# Patient Record
Sex: FEMALE | Race: WHITE | Employment: UNEMPLOYED | URBAN - METROPOLITAN AREA
[De-identification: names, ages, dates, MRNs, and addresses within clinical notes are randomized per-mention and may not be internally consistent; named-entity substitution may affect disease eponyms.]

---

## 2022-05-15 ENCOUNTER — HOSPITAL ENCOUNTER (OUTPATIENT)
Age: 27
Setting detail: OBSERVATION
Discharge: HOME | End: 2022-05-16
Attending: OBSTETRICS & GYNECOLOGY | Admitting: OBSTETRICS & GYNECOLOGY
Payer: COMMERCIAL

## 2022-05-15 PROBLEM — O16.2 HYPERTENSION AFFECTING PREGNANCY IN SECOND TRIMESTER: Status: ACTIVE | Noted: 2022-05-15

## 2022-05-15 LAB
ALBUMIN SERPL-MCNC: 3 G/DL (ref 3.5–5.2)
ALP BLD-CCNC: 105 U/L (ref 35–104)
ALT SERPL-CCNC: 7 U/L (ref 0–32)
AMPHETAMINE SCREEN, URINE: NOT DETECTED
ANION GAP SERPL CALCULATED.3IONS-SCNC: 12 MMOL/L (ref 7–16)
AST SERPL-CCNC: 13 U/L (ref 0–31)
BACTERIA: ABNORMAL /HPF
BARBITURATE SCREEN URINE: NOT DETECTED
BASOPHILS ABSOLUTE: 0.02 E9/L (ref 0–0.2)
BASOPHILS RELATIVE PERCENT: 0.2 % (ref 0–2)
BENZODIAZEPINE SCREEN, URINE: NOT DETECTED
BILIRUB SERPL-MCNC: <0.2 MG/DL (ref 0–1.2)
BILIRUBIN URINE: NEGATIVE
BLOOD, URINE: NEGATIVE
BUN BLDV-MCNC: 11 MG/DL (ref 6–20)
CALCIUM SERPL-MCNC: 9.3 MG/DL (ref 8.6–10.2)
CANNABINOID SCREEN URINE: POSITIVE
CHLORIDE BLD-SCNC: 102 MMOL/L (ref 98–107)
CLARITY: ABNORMAL
CO2: 21 MMOL/L (ref 22–29)
COCAINE METABOLITE SCREEN URINE: NOT DETECTED
COLOR: YELLOW
CREAT SERPL-MCNC: 0.6 MG/DL (ref 0.5–1)
CRYSTALS, UA: ABNORMAL /HPF
EOSINOPHILS ABSOLUTE: 0.17 E9/L (ref 0.05–0.5)
EOSINOPHILS RELATIVE PERCENT: 1.5 % (ref 0–6)
EPITHELIAL CELLS, UA: ABNORMAL /HPF
FENTANYL SCREEN, URINE: NOT DETECTED
GFR AFRICAN AMERICAN: >60
GFR NON-AFRICAN AMERICAN: >60 ML/MIN/1.73
GLUCOSE BLD-MCNC: 96 MG/DL (ref 74–99)
GLUCOSE URINE: NEGATIVE MG/DL
HCT VFR BLD CALC: 37.2 % (ref 34–48)
HEMOGLOBIN: 12.7 G/DL (ref 11.5–15.5)
IMMATURE GRANULOCYTES #: 0.04 E9/L
IMMATURE GRANULOCYTES %: 0.4 % (ref 0–5)
KETONES, URINE: ABNORMAL MG/DL
LEUKOCYTE ESTERASE, URINE: ABNORMAL
LYMPHOCYTES ABSOLUTE: 3.74 E9/L (ref 1.5–4)
LYMPHOCYTES RELATIVE PERCENT: 32.8 % (ref 20–42)
Lab: ABNORMAL
MCH RBC QN AUTO: 30.5 PG (ref 26–35)
MCHC RBC AUTO-ENTMCNC: 34.1 % (ref 32–34.5)
MCV RBC AUTO: 89.2 FL (ref 80–99.9)
METHADONE SCREEN, URINE: NOT DETECTED
MONOCYTES ABSOLUTE: 0.9 E9/L (ref 0.1–0.95)
MONOCYTES RELATIVE PERCENT: 7.9 % (ref 2–12)
NEUTROPHILS ABSOLUTE: 6.53 E9/L (ref 1.8–7.3)
NEUTROPHILS RELATIVE PERCENT: 57.2 % (ref 43–80)
NITRITE, URINE: NEGATIVE
OPIATE SCREEN URINE: NOT DETECTED
OXYCODONE URINE: NOT DETECTED
PDW BLD-RTO: 12.7 FL (ref 11.5–15)
PH UA: 6 (ref 5–9)
PHENCYCLIDINE SCREEN URINE: NOT DETECTED
PLATELET # BLD: 291 E9/L (ref 130–450)
PMV BLD AUTO: 9.7 FL (ref 7–12)
POTASSIUM SERPL-SCNC: 3.8 MMOL/L (ref 3.5–5)
PROTEIN UA: 30 MG/DL
RBC # BLD: 4.17 E12/L (ref 3.5–5.5)
RBC UA: ABNORMAL /HPF (ref 0–2)
SODIUM BLD-SCNC: 135 MMOL/L (ref 132–146)
SPECIFIC GRAVITY UA: >=1.03 (ref 1–1.03)
TOTAL PROTEIN: 6.3 G/DL (ref 6.4–8.3)
URIC ACID, SERUM: 6 MG/DL (ref 2.4–5.7)
UROBILINOGEN, URINE: 1 E.U./DL
WBC # BLD: 11.4 E9/L (ref 4.5–11.5)
WBC UA: ABNORMAL /HPF (ref 0–5)

## 2022-05-15 PROCEDURE — 96360 HYDRATION IV INFUSION INIT: CPT

## 2022-05-15 PROCEDURE — 96376 TX/PRO/DX INJ SAME DRUG ADON: CPT

## 2022-05-15 PROCEDURE — 96374 THER/PROPH/DIAG INJ IV PUSH: CPT

## 2022-05-15 PROCEDURE — 96366 THER/PROPH/DIAG IV INF ADDON: CPT

## 2022-05-15 PROCEDURE — 96375 TX/PRO/DX INJ NEW DRUG ADDON: CPT

## 2022-05-15 PROCEDURE — 80307 DRUG TEST PRSMV CHEM ANLYZR: CPT

## 2022-05-15 PROCEDURE — G0378 HOSPITAL OBSERVATION PER HR: HCPCS

## 2022-05-15 PROCEDURE — 84550 ASSAY OF BLOOD/URIC ACID: CPT

## 2022-05-15 PROCEDURE — 6360000002 HC RX W HCPCS: Performed by: OBSTETRICS & GYNECOLOGY

## 2022-05-15 PROCEDURE — 80053 COMPREHEN METABOLIC PANEL: CPT

## 2022-05-15 PROCEDURE — 85025 COMPLETE CBC W/AUTO DIFF WBC: CPT

## 2022-05-15 PROCEDURE — 96372 THER/PROPH/DIAG INJ SC/IM: CPT

## 2022-05-15 PROCEDURE — 96361 HYDRATE IV INFUSION ADD-ON: CPT

## 2022-05-15 PROCEDURE — 96365 THER/PROPH/DIAG IV INF INIT: CPT

## 2022-05-15 PROCEDURE — 81001 URINALYSIS AUTO W/SCOPE: CPT

## 2022-05-15 PROCEDURE — 36415 COLL VENOUS BLD VENIPUNCTURE: CPT

## 2022-05-15 PROCEDURE — 6370000000 HC RX 637 (ALT 250 FOR IP): Performed by: OBSTETRICS & GYNECOLOGY

## 2022-05-15 PROCEDURE — G0480 DRUG TEST DEF 1-7 CLASSES: HCPCS

## 2022-05-15 PROCEDURE — 2580000003 HC RX 258: Performed by: OBSTETRICS & GYNECOLOGY

## 2022-05-15 RX ORDER — CALCIUM GLUCONATE 94 MG/ML
1000 INJECTION, SOLUTION INTRAVENOUS PRN
Status: DISCONTINUED | OUTPATIENT
Start: 2022-05-15 | End: 2022-05-16 | Stop reason: HOSPADM

## 2022-05-15 RX ORDER — SODIUM CHLORIDE, SODIUM LACTATE, POTASSIUM CHLORIDE, CALCIUM CHLORIDE 600; 310; 30; 20 MG/100ML; MG/100ML; MG/100ML; MG/100ML
INJECTION, SOLUTION INTRAVENOUS CONTINUOUS
Status: DISCONTINUED | OUTPATIENT
Start: 2022-05-15 | End: 2022-05-16 | Stop reason: HOSPADM

## 2022-05-15 RX ORDER — SERTRALINE HYDROCHLORIDE 100 MG/1
100 TABLET, FILM COATED ORAL DAILY
COMMUNITY

## 2022-05-15 RX ORDER — BETAMETHASONE SODIUM PHOSPHATE AND BETAMETHASONE ACETATE 3; 3 MG/ML; MG/ML
12 INJECTION, SUSPENSION INTRA-ARTICULAR; INTRALESIONAL; INTRAMUSCULAR; SOFT TISSUE ONCE
Status: COMPLETED | OUTPATIENT
Start: 2022-05-16 | End: 2022-05-16

## 2022-05-15 RX ORDER — MAGNESIUM SULFATE IN WATER 40 MG/ML
4000 INJECTION, SOLUTION INTRAVENOUS ONCE
Status: COMPLETED | OUTPATIENT
Start: 2022-05-15 | End: 2022-05-15

## 2022-05-15 RX ORDER — ACETAMINOPHEN 325 MG/1
650 TABLET ORAL EVERY 4 HOURS PRN
Status: DISCONTINUED | OUTPATIENT
Start: 2022-05-15 | End: 2022-05-16 | Stop reason: HOSPADM

## 2022-05-15 RX ORDER — BETAMETHASONE SODIUM PHOSPHATE AND BETAMETHASONE ACETATE 3; 3 MG/ML; MG/ML
12 INJECTION, SUSPENSION INTRA-ARTICULAR; INTRALESIONAL; INTRAMUSCULAR; SOFT TISSUE ONCE
Status: COMPLETED | OUTPATIENT
Start: 2022-05-15 | End: 2022-05-15

## 2022-05-15 RX ADMIN — ACETAMINOPHEN 650 MG: 325 TABLET ORAL at 13:18

## 2022-05-15 RX ADMIN — SODIUM CHLORIDE, POTASSIUM CHLORIDE, SODIUM LACTATE AND CALCIUM CHLORIDE: 600; 310; 30; 20 INJECTION, SOLUTION INTRAVENOUS at 01:08

## 2022-05-15 RX ADMIN — MAGNESIUM SULFATE IN WATER 2000 MG/HR: 40 INJECTION, SOLUTION INTRAVENOUS at 13:23

## 2022-05-15 RX ADMIN — SODIUM CHLORIDE, POTASSIUM CHLORIDE, SODIUM LACTATE AND CALCIUM CHLORIDE: 600; 310; 30; 20 INJECTION, SOLUTION INTRAVENOUS at 08:20

## 2022-05-15 RX ADMIN — MAGNESIUM SULFATE HEPTAHYDRATE 4000 MG: 40 INJECTION, SOLUTION INTRAVENOUS at 02:52

## 2022-05-15 RX ADMIN — SODIUM CHLORIDE, POTASSIUM CHLORIDE, SODIUM LACTATE AND CALCIUM CHLORIDE: 600; 310; 30; 20 INJECTION, SOLUTION INTRAVENOUS at 23:41

## 2022-05-15 RX ADMIN — MAGNESIUM SULFATE IN WATER 2000 MG/HR: 40 INJECTION, SOLUTION INTRAVENOUS at 03:17

## 2022-05-15 RX ADMIN — SODIUM CHLORIDE, POTASSIUM CHLORIDE, SODIUM LACTATE AND CALCIUM CHLORIDE: 600; 310; 30; 20 INJECTION, SOLUTION INTRAVENOUS at 16:00

## 2022-05-15 RX ADMIN — SERTRALINE 100 MG: 50 TABLET, FILM COATED ORAL at 15:46

## 2022-05-15 RX ADMIN — BETAMETHASONE SODIUM PHOSPHATE AND BETAMETHASONE ACETATE 12 MG: 3; 3 INJECTION, SUSPENSION INTRA-ARTICULAR; INTRALESIONAL; INTRAMUSCULAR at 02:53

## 2022-05-15 RX ADMIN — ACETAMINOPHEN 650 MG: 325 TABLET ORAL at 21:53

## 2022-05-15 ASSESSMENT — PAIN SCALES - GENERAL
PAINLEVEL_OUTOF10: 3
PAINLEVEL_OUTOF10: 2

## 2022-05-15 ASSESSMENT — PAIN - FUNCTIONAL ASSESSMENT: PAIN_FUNCTIONAL_ASSESSMENT: ACTIVITIES ARE NOT PREVENTED

## 2022-05-15 ASSESSMENT — PAIN DESCRIPTION - DESCRIPTORS
DESCRIPTORS: POUNDING
DESCRIPTORS: OTHER (COMMENT)

## 2022-05-15 ASSESSMENT — PAIN DESCRIPTION - ORIENTATION
ORIENTATION: OTHER (COMMENT)
ORIENTATION: MID

## 2022-05-15 ASSESSMENT — PAIN DESCRIPTION - LOCATION
LOCATION: HEAD
LOCATION: HEAD

## 2022-05-15 NOTE — PROGRESS NOTES
Pt here 30wks G1 with c/o feeling lethargic at home and her mom took her BP and it was elevated. No c/o HA, blurred vision or epigastric pain. Pt states that her sister has just recently lost her baby due to preeclampsia and they were due only a few weeks apart. Pt does states that she has a hx of depression and has really been upset. Currently taking Zoloft and has been hospitalized in the past for SI. Pt states that she has been feeling the baby move normal for self, no leakage of fluid or vaginal bleeding.

## 2022-05-15 NOTE — PROGRESS NOTES
Dr. Nancy Jasso at the bedside to see patient. New orders given. 24 hour urine start time is 1020 5/15/2022 end time is 1020 5/16/2022. Patient is to use call light when up to void to have assistance to the bathroom. Patient is also to remain NPO. Patient educated on 24 hour urine collection and acknowledged education.  Call light within reach

## 2022-05-15 NOTE — PROGRESS NOTES
Assumed care of pt at this time from previous RN. Plan of care discussed. Pt verbalized understanding. No concerns expressed at this time. Pt lying in bed resting. Call light within reach.

## 2022-05-15 NOTE — PROGRESS NOTES
Assumed care of pt at this time. Plan of care discussed. Pt verbalized understanding. No other concerns expressed at this time. Hourly  vital signs, deep tendon reflexes, and LOC assessed. Patient on EKG continuously as well as FHM continuously assessed and documented every half hour. Patient NPO until new order given. Call light within reach.

## 2022-05-16 VITALS
DIASTOLIC BLOOD PRESSURE: 72 MMHG | HEART RATE: 68 BPM | RESPIRATION RATE: 18 BRPM | BODY MASS INDEX: 32.43 KG/M2 | SYSTOLIC BLOOD PRESSURE: 126 MMHG | OXYGEN SATURATION: 98 % | WEIGHT: 214 LBS | HEIGHT: 68 IN | TEMPERATURE: 98.1 F

## 2022-05-16 LAB
24HR URINE VOLUME (ML): 2475 ML
CREATININE 24 HOUR URINE: 1238 MG/24H (ref 720–1510)
INTEGRITY CHECK, CREATININE, URINE: 299.5
INTEGRITY CHECK, OXIDANT, URINE: 44
INTEGRITY CHECK, PH, URINE: 5.5 (ref 4.5–9)
INTEGRITY CHECK, SPECIFIC GRAVITY, URINE: 1.03 (ref 1–1.03)
INTEGRITY CHECK, SPECIMEN INTEGRITY, URINE: ABNORMAL
Lab: 24 HOURS
PROTEIN 24 HOUR URINE: 0.2 G/24HR (ref 0–0.14)

## 2022-05-16 PROCEDURE — 84156 ASSAY OF PROTEIN URINE: CPT

## 2022-05-16 PROCEDURE — 99202 OFFICE O/P NEW SF 15 MIN: CPT

## 2022-05-16 PROCEDURE — G0378 HOSPITAL OBSERVATION PER HR: HCPCS

## 2022-05-16 PROCEDURE — 96372 THER/PROPH/DIAG INJ SC/IM: CPT

## 2022-05-16 PROCEDURE — 96366 THER/PROPH/DIAG IV INF ADDON: CPT

## 2022-05-16 PROCEDURE — 6370000000 HC RX 637 (ALT 250 FOR IP): Performed by: OBSTETRICS & GYNECOLOGY

## 2022-05-16 PROCEDURE — 2580000003 HC RX 258: Performed by: OBSTETRICS & GYNECOLOGY

## 2022-05-16 PROCEDURE — 6360000002 HC RX W HCPCS: Performed by: OBSTETRICS & GYNECOLOGY

## 2022-05-16 RX ADMIN — BETAMETHASONE SODIUM PHOSPHATE AND BETAMETHASONE ACETATE 12 MG: 3; 3 INJECTION, SUSPENSION INTRA-ARTICULAR; INTRALESIONAL; INTRAMUSCULAR at 03:05

## 2022-05-16 RX ADMIN — SERTRALINE 100 MG: 50 TABLET, FILM COATED ORAL at 10:09

## 2022-05-16 RX ADMIN — SODIUM CHLORIDE, POTASSIUM CHLORIDE, SODIUM LACTATE AND CALCIUM CHLORIDE: 600; 310; 30; 20 INJECTION, SOLUTION INTRAVENOUS at 07:37

## 2022-05-16 NOTE — PROGRESS NOTES
Dr. Alexa Weaver in department. Orders received to stop Mag, pt may eat when ready, continue fetal heart monitoring, LR infusing, and 24 hr urine continued.

## 2022-05-16 NOTE — PROGRESS NOTES
Pt finished 24 hour urine. Agreeable to taking her Zoloft. Refuses EFM. States her parents ar in the car outside and she again wants to leave. Will follow up with MD, encourage to make an earlier appointment. IV discontinued. Pt signed AMA papers.

## 2022-05-16 NOTE — PROGRESS NOTES
Dr. Cuate Morfin informed of pt voicing plans for discharge. Will complete 24 hour urine and he will see her after office. RN will inform pt of plan.

## 2022-05-16 NOTE — PROGRESS NOTES
Pt states Dr. Jenifer Steiner said she may go home. Informed pt we need to finish 24 hour urine. Pt agreeable.

## 2022-05-17 ENCOUNTER — HOSPITAL ENCOUNTER (OUTPATIENT)
Age: 27
Setting detail: OBSERVATION
Discharge: HOME | End: 2022-05-18
Attending: OBSTETRICS & GYNECOLOGY | Admitting: OBSTETRICS & GYNECOLOGY
Payer: COMMERCIAL

## 2022-05-17 PROBLEM — O16.1 HYPERTENSION AFFECTING PREGNANCY IN FIRST TRIMESTER: Status: ACTIVE | Noted: 2022-05-17

## 2022-05-17 LAB
ALBUMIN SERPL-MCNC: 3.5 G/DL (ref 3.5–5.2)
ALP BLD-CCNC: 103 U/L (ref 35–104)
ALT SERPL-CCNC: 12 U/L (ref 0–32)
AMPHETAMINE SCREEN, URINE: NOT DETECTED
ANION GAP SERPL CALCULATED.3IONS-SCNC: 10 MMOL/L (ref 7–16)
AST SERPL-CCNC: 18 U/L (ref 0–31)
BACTERIA: ABNORMAL /HPF
BARBITURATE SCREEN URINE: NOT DETECTED
BASOPHILS ABSOLUTE: 0.02 E9/L (ref 0–0.2)
BASOPHILS RELATIVE PERCENT: 0.2 % (ref 0–2)
BENZODIAZEPINE SCREEN, URINE: NOT DETECTED
BILIRUB SERPL-MCNC: 0.3 MG/DL (ref 0–1.2)
BILIRUBIN URINE: NEGATIVE
BLOOD, URINE: NEGATIVE
BUN BLDV-MCNC: 10 MG/DL (ref 6–20)
CALCIUM SERPL-MCNC: 8.7 MG/DL (ref 8.6–10.2)
CANNABINOID SCREEN URINE: POSITIVE
CHLORIDE BLD-SCNC: 103 MMOL/L (ref 98–107)
CLARITY: CLEAR
CO2: 21 MMOL/L (ref 22–29)
COCAINE METABOLITE SCREEN URINE: NOT DETECTED
COLOR: YELLOW
COMMENT: NORMAL
CREAT SERPL-MCNC: 0.6 MG/DL (ref 0.5–1)
CRYSTALS, UA: ABNORMAL /HPF
EOSINOPHILS ABSOLUTE: 0.04 E9/L (ref 0.05–0.5)
EOSINOPHILS RELATIVE PERCENT: 0.3 % (ref 0–6)
FENTANYL SCREEN, URINE: NOT DETECTED
GFR AFRICAN AMERICAN: >60
GFR NON-AFRICAN AMERICAN: >60 ML/MIN/1.73
GLUCOSE BLD-MCNC: 66 MG/DL (ref 74–99)
GLUCOSE URINE: NEGATIVE MG/DL
HCT VFR BLD CALC: 38.7 % (ref 34–48)
HEMOGLOBIN: 12.4 G/DL (ref 11.5–15.5)
IMMATURE GRANULOCYTES #: 0.09 E9/L
IMMATURE GRANULOCYTES %: 0.7 % (ref 0–5)
KETONES, URINE: NEGATIVE MG/DL
LEUKOCYTE ESTERASE, URINE: NEGATIVE
LYMPHOCYTES ABSOLUTE: 3.76 E9/L (ref 1.5–4)
LYMPHOCYTES RELATIVE PERCENT: 30.9 % (ref 20–42)
Lab: ABNORMAL
MCH RBC QN AUTO: 30.3 PG (ref 26–35)
MCHC RBC AUTO-ENTMCNC: 32 % (ref 32–34.5)
MCV RBC AUTO: 94.6 FL (ref 80–99.9)
METHADONE SCREEN, URINE: NOT DETECTED
MONOCYTES ABSOLUTE: 0.9 E9/L (ref 0.1–0.95)
MONOCYTES RELATIVE PERCENT: 7.4 % (ref 2–12)
NEUTROPHILS ABSOLUTE: 7.36 E9/L (ref 1.8–7.3)
NEUTROPHILS RELATIVE PERCENT: 60.5 % (ref 43–80)
NITRITE, URINE: NEGATIVE
OPIATE SCREEN URINE: NOT DETECTED
OXYCODONE URINE: NOT DETECTED
PDW BLD-RTO: 13 FL (ref 11.5–15)
PH UA: 7.5 (ref 5–9)
PHENCYCLIDINE SCREEN URINE: NOT DETECTED
PLATELET # BLD: 291 E9/L (ref 130–450)
PMV BLD AUTO: 10.1 FL (ref 7–12)
POTASSIUM SERPL-SCNC: 3.9 MMOL/L (ref 3.5–5)
PROTEIN UA: 100 MG/DL
RBC # BLD: 4.09 E12/L (ref 3.5–5.5)
RBC UA: ABNORMAL /HPF (ref 0–2)
SODIUM BLD-SCNC: 134 MMOL/L (ref 132–146)
SPECIFIC GRAVITY UA: 1.02 (ref 1–1.03)
THC NORMALIZED, QUANTITIATIVE, URINE: NORMAL
THC-COOH, QUANTITATIVE, URINE: >1000
TOTAL PROTEIN: 6.5 G/DL (ref 6.4–8.3)
URIC ACID, SERUM: 5.8 MG/DL (ref 2.4–5.7)
UROBILINOGEN, URINE: 0.2 E.U./DL
WBC # BLD: 12.2 E9/L (ref 4.5–11.5)
WBC UA: ABNORMAL /HPF (ref 0–5)

## 2022-05-17 PROCEDURE — 96361 HYDRATE IV INFUSION ADD-ON: CPT

## 2022-05-17 PROCEDURE — 96376 TX/PRO/DX INJ SAME DRUG ADON: CPT

## 2022-05-17 PROCEDURE — G0480 DRUG TEST DEF 1-7 CLASSES: HCPCS

## 2022-05-17 PROCEDURE — 81001 URINALYSIS AUTO W/SCOPE: CPT

## 2022-05-17 PROCEDURE — 80307 DRUG TEST PRSMV CHEM ANLYZR: CPT

## 2022-05-17 PROCEDURE — 2580000003 HC RX 258: Performed by: OBSTETRICS & GYNECOLOGY

## 2022-05-17 PROCEDURE — 80053 COMPREHEN METABOLIC PANEL: CPT

## 2022-05-17 PROCEDURE — 84550 ASSAY OF BLOOD/URIC ACID: CPT

## 2022-05-17 PROCEDURE — 85025 COMPLETE CBC W/AUTO DIFF WBC: CPT

## 2022-05-17 PROCEDURE — 36415 COLL VENOUS BLD VENIPUNCTURE: CPT

## 2022-05-17 PROCEDURE — 6370000000 HC RX 637 (ALT 250 FOR IP): Performed by: OBSTETRICS & GYNECOLOGY

## 2022-05-17 PROCEDURE — 2500000003 HC RX 250 WO HCPCS: Performed by: OBSTETRICS & GYNECOLOGY

## 2022-05-17 RX ORDER — LABETALOL 20 MG/4 ML (5 MG/ML) INTRAVENOUS SYRINGE
10 ONCE
Status: COMPLETED | OUTPATIENT
Start: 2022-05-17 | End: 2022-05-17

## 2022-05-17 RX ORDER — LABETALOL 200 MG/1
100 TABLET, FILM COATED ORAL 2 TIMES DAILY
Status: DISCONTINUED | OUTPATIENT
Start: 2022-05-17 | End: 2022-05-18 | Stop reason: HOSPADM

## 2022-05-17 RX ORDER — LABETALOL 20 MG/4 ML (5 MG/ML) INTRAVENOUS SYRINGE
20 ONCE
Status: COMPLETED | OUTPATIENT
Start: 2022-05-17 | End: 2022-05-17

## 2022-05-17 RX ORDER — SODIUM CHLORIDE, SODIUM LACTATE, POTASSIUM CHLORIDE, CALCIUM CHLORIDE 600; 310; 30; 20 MG/100ML; MG/100ML; MG/100ML; MG/100ML
INJECTION, SOLUTION INTRAVENOUS CONTINUOUS
Status: DISCONTINUED | OUTPATIENT
Start: 2022-05-17 | End: 2022-05-18 | Stop reason: HOSPADM

## 2022-05-17 RX ADMIN — LABETALOL 20 MG/4 ML (5 MG/ML) INTRAVENOUS SYRINGE 10 MG: at 15:20

## 2022-05-17 RX ADMIN — LABETALOL HYDROCHLORIDE 100 MG: 200 TABLET, FILM COATED ORAL at 21:13

## 2022-05-17 RX ADMIN — SODIUM CHLORIDE, POTASSIUM CHLORIDE, SODIUM LACTATE AND CALCIUM CHLORIDE: 600; 310; 30; 20 INJECTION, SOLUTION INTRAVENOUS at 21:11

## 2022-05-17 RX ADMIN — LABETALOL 20 MG/4 ML (5 MG/ML) INTRAVENOUS SYRINGE 20 MG: at 17:28

## 2022-05-17 NOTE — PROGRESS NOTES
32w5d  EDC 22 via EMS to L&D for c/o hypertension at home. Patient reports that she was told to monitor her blood pressure readings at home because her sister recently suffered a miscarriage due to preeclampsia. Patient also recently seen on L&D for elevated blood pressures. Blood pressure reading at home was 145/101 per patient. EFM applied. Maternal perception of fetal movement noted. Denies any PIH symptoms, bleeding, or leaking of fluid. Does report occasional back pain but states this is not new. Patient presents with very flat affect, states she feels \"off\" but that her Zoloft dose was recently increased and she is very tired from crying a lot lately. Denies any thoughts of hurting self or others. Blood pressure elevated upon admission, cycling for trend. Call light in reach.

## 2022-05-17 NOTE — H&P
Subjective:      Danny Zavala is an 24 y.o. female at 28 and 5/7 weeks gestation presenting with   Chief Complaint   Patient presents with    Hypertension   . She is also complaining of contractions every a few minutes lasting few seconds. Other associated symptoms include low back pain. Fetal Movement: normal.  Patient was here yesterday for PIH work-up and has received 2 doses of Celestone and about 24 hours of magnesium sulfate. Patient discharged herself [address] yesterday afternoon. She claims that she protect her blood pressure at home today and was elevated to 142/80 and consequently decided to come in for further evaluation. Denies any headaches or blurry vision. On admission no her blood pressure was elevated to 170/96 and labetalol IV was given. PIH labs were repeated and no significant change. 24-hour urine collection was reviewed from yesterday and showed 200 mg of proteins. No past medical history on file. Review of Systems  Pertinent items are noted in HPI. Objective:      BP (!) 170/96   Pulse 60   Resp 16   LMP 09/30/2021   Blood pressure (!) 170/96, pulse 60, resp. rate 16, last menstrual period 09/30/2021. General:   alert, appears stated age and cooperative   Cervix:   closed.    FHT:   135 BPM     Lab Review    CBC:   Lab Results   Component Value Date    WBC 12.2 05/17/2022    RBC 4.09 05/17/2022    HGB 12.4 05/17/2022    HCT 38.7 05/17/2022    MCV 94.6 05/17/2022    MCH 30.3 05/17/2022    MCHC 32.0 05/17/2022    RDW 13.0 05/17/2022     05/17/2022    MPV 10.1 05/17/2022     CMP:    Lab Results   Component Value Date     05/17/2022    K 3.9 05/17/2022     05/17/2022    CO2 21 05/17/2022    BUN 10 05/17/2022    CREATININE 0.6 05/17/2022    GFRAA >60 05/17/2022    LABGLOM >60 05/17/2022    GLUCOSE 66 05/17/2022    PROT 6.5 05/17/2022    LABALBU 3.5 05/17/2022    CALCIUM 8.7 05/17/2022    BILITOT 0.3 05/17/2022    ALKPHOS 103 05/17/2022    AST 18 05/17/2022    ALT 12 05/17/2022     U/A:    Lab Results   Component Value Date    COLORU Yellow 05/17/2022    PHUR 7.5 05/17/2022    WBCUA NONE 05/17/2022    RBCUA NONE 05/17/2022    BACTERIA FEW 05/17/2022    CLARITYU Clear 05/17/2022    SPECGRAV 1.020 05/17/2022    LEUKOCYTESUR Negative 05/17/2022    UROBILINOGEN 0.2 05/17/2022    BILIRUBINUR Negative 05/17/2022    BLOODU Negative 05/17/2022    GLUCOSEU Negative 05/17/2022          Assessment:     Pregnancy-induced hypertension    Plan:      Continue close observation  Will start labetalol 100 mg p.o. twice daily and will administer IV labetalol as needed    Lizett Mcnamara MD,MD,5/17/2022 5:11 PM

## 2022-05-17 NOTE — PROGRESS NOTES
Assuming care of patient. Patient resting in bed comfortably with no complaints. Maternal perception of fetal movement noted. Plan of care discussed with patient; patient verbalizes understanding. Call light in reach.

## 2022-05-18 VITALS
RESPIRATION RATE: 16 BRPM | DIASTOLIC BLOOD PRESSURE: 95 MMHG | OXYGEN SATURATION: 100 % | TEMPERATURE: 98 F | HEART RATE: 50 BPM | SYSTOLIC BLOOD PRESSURE: 142 MMHG

## 2022-05-18 PROBLEM — O14.03 MILD PRE-ECLAMPSIA IN THIRD TRIMESTER: Status: ACTIVE | Noted: 2022-05-18

## 2022-05-18 LAB
COMMENT: NORMAL
INTEGRITY CHECK, CREATININE, URINE: 291.2
INTEGRITY CHECK, OXIDANT, URINE: 54
INTEGRITY CHECK, PH, URINE: 7.1 (ref 4.5–9)
INTEGRITY CHECK, SPECIFIC GRAVITY, URINE: 1.01 (ref 1–1.03)
INTEGRITY CHECK, SPECIMEN INTEGRITY, URINE: ABNORMAL
THC NORMALIZED, QUANTITIATIVE, URINE: NORMAL
THC-COOH, QUANTITATIVE, URINE: >1000

## 2022-05-18 PROCEDURE — G0378 HOSPITAL OBSERVATION PER HR: HCPCS

## 2022-05-18 PROCEDURE — 96361 HYDRATE IV INFUSION ADD-ON: CPT

## 2022-05-18 PROCEDURE — 96360 HYDRATION IV INFUSION INIT: CPT

## 2022-05-18 PROCEDURE — 2580000003 HC RX 258: Performed by: OBSTETRICS & GYNECOLOGY

## 2022-05-18 PROCEDURE — 96374 THER/PROPH/DIAG INJ IV PUSH: CPT

## 2022-05-18 PROCEDURE — 6370000000 HC RX 637 (ALT 250 FOR IP): Performed by: OBSTETRICS & GYNECOLOGY

## 2022-05-18 PROCEDURE — 99212 OFFICE O/P EST SF 10 MIN: CPT

## 2022-05-18 RX ORDER — LABETALOL 100 MG/1
100 TABLET, FILM COATED ORAL 2 TIMES DAILY
Qty: 60 TABLET | Refills: 3 | Status: SHIPPED | OUTPATIENT
Start: 2022-05-18

## 2022-05-18 RX ADMIN — SODIUM CHLORIDE, POTASSIUM CHLORIDE, SODIUM LACTATE AND CALCIUM CHLORIDE: 600; 310; 30; 20 INJECTION, SOLUTION INTRAVENOUS at 13:14

## 2022-05-18 RX ADMIN — SODIUM CHLORIDE, POTASSIUM CHLORIDE, SODIUM LACTATE AND CALCIUM CHLORIDE: 600; 310; 30; 20 INJECTION, SOLUTION INTRAVENOUS at 05:19

## 2022-05-18 RX ADMIN — LABETALOL HYDROCHLORIDE 100 MG: 200 TABLET, FILM COATED ORAL at 08:27

## 2022-05-18 NOTE — PROGRESS NOTES
Department of Obstetrics and Gynecology  Labor and Delivery   Attending Progress Note      SUBJECTIVE: Doing well with labetalol. Denies any headaches, blurry vision, right upper quadrant or epigastric pain. Patient insists on being discharged. OBJECTIVE:  Blood pressure (!) 153/85, pulse 58, temperature 98 °F (36.7 °C), temperature source Oral, resp. rate 16, last menstrual period 09/30/2021, SpO2 100 %. Fetal heart rate:       Baseline Heart Rate: 140       Accelerations:  present       Long Term Variability:  moderate       Decelerations:  absent         Contraction frequency: None    Membranes:  Intact    Cervix:         Deferred           ASSESSMENT     32 weeks and 6-day pregnancy  Mild preeclampsia  Status post Celestone and magnesium sulfate prophylaxis    PLAN:    Advised patient observation, however she declined and threatened to leave [address] Patient discharged herself 2 days ago [address] Patient is having social issues at home and will be staying with her boyfriend. Risks and complication of preeclampsia progressing to be severe was discussed. Patient was advised to come back or go to the nearest labor and delivery unit in case she starts developing any kind of severe headaches, blurry vision, epigastric or right upper quadrant pain.   Patient also has a blood pressure machine at home and was asked to check her blood pressure frequently and to come back in case her blood pressure is 160/100 or higher  Will keep patient on labetalol 100 mg p.o. twice daily for now and to follow-up in the office in 1 week if stable

## 2022-05-18 NOTE — PROGRESS NOTES
Assumed care of pt at this time from previous RN. Plan of care discussed. Pt verbalized understanding. No concerns expressed at this time. Call light within reach.

## 2022-05-18 NOTE — PROGRESS NOTES
External fetal monitors applied to pt for intermittent monitoring. VS and assessment. Pt comfortable and states that she is just trying to sleep. IV infusing.

## 2022-05-22 NOTE — H&P
Subjective:      New Fitch is an 24 y.o. female at 27 and 1/7 weeks gestation presenting with   Chief Complaint   Patient presents with    Hypertension   Patient has been having some headaches but denies blurry vision. Denies right upper quadrant or epigastric pains. Positive family history of preeclampsia in her sister. Patient had a blood pressure machine at home, checked her blood pressure and was noted to be elevated and consequently decided to come to labor and delivery for evaluation. [address] labs were obtained. Celestone 12 mg has been given. Magnesium sulfate is being infused. . Other associated symptoms include low back pain and nausea. Fetal Movement: normal.  Past Medical History:   Diagnosis Date    Mild pre-eclampsia in third trimester 5/18/2022       Review of Systems  Pertinent items are noted in HPI. Objective:      /72   Pulse 68   Temp 98.1 °F (36.7 °C) (Oral)   Resp 18   Ht 5' 8\" (1.727 m)   Wt 214 lb (97.1 kg)   LMP 09/30/2021   SpO2 98%   BMI 32.54 kg/m²   Blood pressure 126/72, pulse 68, temperature 98.1 °F (36.7 °C), temperature source Oral, resp. rate 18, height 5' 8\" (1.727 m), weight 214 lb (97.1 kg), last menstrual period 09/30/2021, SpO2 98 %. General:   alert, appears stated age and cooperative   Cervix:   closed.    FHT:   140 BPM     Lab Review    CBC:   Lab Results   Component Value Date    WBC 12.2 05/17/2022    RBC 4.09 05/17/2022    HGB 12.4 05/17/2022    HCT 38.7 05/17/2022    MCV 94.6 05/17/2022    MCH 30.3 05/17/2022    MCHC 32.0 05/17/2022    RDW 13.0 05/17/2022     05/17/2022    MPV 10.1 05/17/2022     CMP:    Lab Results   Component Value Date     05/17/2022    K 3.9 05/17/2022     05/17/2022    CO2 21 05/17/2022    BUN 10 05/17/2022    CREATININE 0.6 05/17/2022    GFRAA >60 05/17/2022    LABGLOM >60 05/17/2022    GLUCOSE 66 05/17/2022    PROT 6.5 05/17/2022    LABALBU 3.5 05/17/2022    CALCIUM 8.7 05/17/2022    BILITOT 0.3 05/17/2022    ALKPHOS 103 05/17/2022    AST 18 05/17/2022    ALT 12 05/17/2022     U/A:    Lab Results   Component Value Date    COLORU Yellow 05/17/2022    PHUR 7.5 05/17/2022    WBCUA NONE 05/17/2022    RBCUA NONE 05/17/2022    BACTERIA FEW 05/17/2022    CLARITYU Clear 05/17/2022    SPECGRAV 1.020 05/17/2022    LEUKOCYTESUR Negative 05/17/2022    UROBILINOGEN 0.2 05/17/2022    BILIRUBINUR Negative 05/17/2022    BLOODU Negative 05/17/2022    GLUCOSEU Negative 05/17/2022          Assessment:     30 weeks gestation  Preeclampsia    Plan:     Continue with magnesium sulfate for neuro prophylaxis  Will administer the second dose of Celestone  Continue with 24-hour urine collection    Lizett Mcnamara MD,MD,5/22/2022 6:42 PM

## 2022-06-27 ENCOUNTER — HOSPITAL ENCOUNTER (EMERGENCY)
Age: 27
Discharge: HOME | End: 2022-06-27
Attending: EMERGENCY MEDICINE
Payer: COMMERCIAL

## 2022-06-27 ENCOUNTER — APPOINTMENT (OUTPATIENT)
Dept: CT IMAGING | Age: 27
End: 2022-06-27
Payer: COMMERCIAL

## 2022-06-27 VITALS
DIASTOLIC BLOOD PRESSURE: 72 MMHG | RESPIRATION RATE: 16 BRPM | OXYGEN SATURATION: 98 % | TEMPERATURE: 98.2 F | SYSTOLIC BLOOD PRESSURE: 128 MMHG | WEIGHT: 206 LBS | HEART RATE: 76 BPM | HEIGHT: 68 IN | BODY MASS INDEX: 31.22 KG/M2

## 2022-06-27 LAB
ANION GAP SERPL CALCULATED.3IONS-SCNC: 12 MMOL/L (ref 7–16)
BASOPHILS ABSOLUTE: 0.03 E9/L (ref 0–0.2)
BASOPHILS RELATIVE PERCENT: 0.4 % (ref 0–2)
BUN BLDV-MCNC: 10 MG/DL (ref 6–20)
CALCIUM SERPL-MCNC: 9.5 MG/DL (ref 8.6–10.2)
CHLORIDE BLD-SCNC: 103 MMOL/L (ref 98–107)
CO2: 22 MMOL/L (ref 22–29)
CREAT SERPL-MCNC: 0.9 MG/DL (ref 0.5–1)
EOSINOPHILS ABSOLUTE: 0.24 E9/L (ref 0.05–0.5)
EOSINOPHILS RELATIVE PERCENT: 2.8 % (ref 0–6)
GFR AFRICAN AMERICAN: >60
GFR NON-AFRICAN AMERICAN: >60 ML/MIN/1.73
GLUCOSE BLD-MCNC: 74 MG/DL (ref 74–99)
HCT VFR BLD CALC: 40.1 % (ref 34–48)
HEMOGLOBIN: 12.5 G/DL (ref 11.5–15.5)
IMMATURE GRANULOCYTES #: 0.02 E9/L
IMMATURE GRANULOCYTES %: 0.2 % (ref 0–5)
LYMPHOCYTES ABSOLUTE: 3.05 E9/L (ref 1.5–4)
LYMPHOCYTES RELATIVE PERCENT: 36.1 % (ref 20–42)
MCH RBC QN AUTO: 28.5 PG (ref 26–35)
MCHC RBC AUTO-ENTMCNC: 31.2 % (ref 32–34.5)
MCV RBC AUTO: 91.3 FL (ref 80–99.9)
MONOCYTES ABSOLUTE: 0.75 E9/L (ref 0.1–0.95)
MONOCYTES RELATIVE PERCENT: 8.9 % (ref 2–12)
NEUTROPHILS ABSOLUTE: 4.37 E9/L (ref 1.8–7.3)
NEUTROPHILS RELATIVE PERCENT: 51.6 % (ref 43–80)
PDW BLD-RTO: 12.6 FL (ref 11.5–15)
PLATELET # BLD: 266 E9/L (ref 130–450)
PMV BLD AUTO: 9 FL (ref 7–12)
POTASSIUM REFLEX MAGNESIUM: 3.9 MMOL/L (ref 3.5–5)
RBC # BLD: 4.39 E12/L (ref 3.5–5.5)
SODIUM BLD-SCNC: 137 MMOL/L (ref 132–146)
WBC # BLD: 8.5 E9/L (ref 4.5–11.5)

## 2022-06-27 PROCEDURE — 85025 COMPLETE CBC W/AUTO DIFF WBC: CPT

## 2022-06-27 PROCEDURE — 2580000003 HC RX 258: Performed by: EMERGENCY MEDICINE

## 2022-06-27 PROCEDURE — 36415 COLL VENOUS BLD VENIPUNCTURE: CPT

## 2022-06-27 PROCEDURE — 6370000000 HC RX 637 (ALT 250 FOR IP): Performed by: EMERGENCY MEDICINE

## 2022-06-27 PROCEDURE — 80048 BASIC METABOLIC PNL TOTAL CA: CPT

## 2022-06-27 PROCEDURE — 6360000002 HC RX W HCPCS: Performed by: EMERGENCY MEDICINE

## 2022-06-27 PROCEDURE — 6360000004 HC RX CONTRAST MEDICATION

## 2022-06-27 PROCEDURE — 74177 CT ABD & PELVIS W/CONTRAST: CPT

## 2022-06-27 PROCEDURE — 96374 THER/PROPH/DIAG INJ IV PUSH: CPT

## 2022-06-27 PROCEDURE — 99284 EMERGENCY DEPT VISIT MOD MDM: CPT

## 2022-06-27 RX ORDER — CEPHALEXIN 250 MG/1
500 CAPSULE ORAL ONCE
Status: COMPLETED | OUTPATIENT
Start: 2022-06-27 | End: 2022-06-27

## 2022-06-27 RX ORDER — KETOROLAC TROMETHAMINE 30 MG/ML
30 INJECTION, SOLUTION INTRAMUSCULAR; INTRAVENOUS ONCE
Status: COMPLETED | OUTPATIENT
Start: 2022-06-27 | End: 2022-06-27

## 2022-06-27 RX ORDER — CEPHALEXIN 500 MG/1
500 CAPSULE ORAL 4 TIMES DAILY
Qty: 40 CAPSULE | Refills: 0 | Status: SHIPPED | OUTPATIENT
Start: 2022-06-27 | End: 2022-07-01

## 2022-06-27 RX ORDER — 0.9 % SODIUM CHLORIDE 0.9 %
1000 INTRAVENOUS SOLUTION INTRAVENOUS ONCE
Status: COMPLETED | OUTPATIENT
Start: 2022-06-27 | End: 2022-06-27

## 2022-06-27 RX ADMIN — SODIUM CHLORIDE 1000 ML: 9 INJECTION, SOLUTION INTRAVENOUS at 21:25

## 2022-06-27 RX ADMIN — KETOROLAC TROMETHAMINE 30 MG: 30 INJECTION, SOLUTION INTRAMUSCULAR; INTRAVENOUS at 21:28

## 2022-06-27 RX ADMIN — CEPHALEXIN 500 MG: 250 CAPSULE ORAL at 22:49

## 2022-06-27 ASSESSMENT — PAIN DESCRIPTION - LOCATION
LOCATION: ABDOMEN;BACK
LOCATION: ABDOMEN

## 2022-06-27 ASSESSMENT — ENCOUNTER SYMPTOMS
BACK PAIN: 0
NAUSEA: 1
COLOR CHANGE: 0
VOMITING: 1
SHORTNESS OF BREATH: 0
COUGH: 0
ABDOMINAL PAIN: 1

## 2022-06-27 ASSESSMENT — PAIN SCALES - GENERAL
PAINLEVEL_OUTOF10: 4
PAINLEVEL_OUTOF10: 6

## 2022-06-27 ASSESSMENT — PAIN - FUNCTIONAL ASSESSMENT: PAIN_FUNCTIONAL_ASSESSMENT: 0-10

## 2022-06-27 NOTE — ED PROVIDER NOTES
Patient presents to the ED for evaluation of drainage from her  site. She had a  done on 23 May. Patient states that she had been doing well but noticed recently that there was some drainage on the left side of the incision. She states that she has not seen her OB recently. She said that the drainage was purulent and bloody. She reports mild pain in that site. No fever or chills. She does have associated nausea and vomiting. She was given Zofran in route by EMS and is feeling little better. Has not taken anything recently for pain control. Denies any pain rating into her chest or back. Has not noted anything to make her symptoms better or worse. They have been persistent and gradually worsening. Review of Systems   Constitutional: Negative for chills, diaphoresis, fatigue and fever. Eyes: Negative for visual disturbance. Respiratory: Negative for cough and shortness of breath. Cardiovascular: Negative for chest pain and palpitations. Gastrointestinal: Positive for abdominal pain, nausea and vomiting. Genitourinary: Negative for decreased urine volume, difficulty urinating, vaginal bleeding and vaginal discharge. Musculoskeletal: Negative for back pain and myalgias. Skin: Positive for wound. Negative for color change and pallor. Neurological: Negative for dizziness, syncope and light-headedness. Hematological: Does not bruise/bleed easily. Physical Exam  Vitals and nursing note reviewed. Exam conducted with a chaperone present. Constitutional:       General: She is not in acute distress. Appearance: She is well-developed and normal weight. She is not ill-appearing or diaphoretic. HENT:      Head: Normocephalic and atraumatic. Eyes:      Conjunctiva/sclera: Conjunctivae normal.   Cardiovascular:      Rate and Rhythm: Normal rate and regular rhythm. Heart sounds: Normal heart sounds. No murmur heard.       Pulmonary:      Effort: Pulmonary effort is normal. No respiratory distress. Breath sounds: Normal breath sounds. No wheezing or rales. Abdominal:      General: Bowel sounds are normal. There is no distension. Palpations: Abdomen is soft. Tenderness: There is abdominal tenderness ( Mild tenderness to palpation on the left portion of abdomen near the incision site. ). There is no guarding or rebound. Comments:  incision site was examined with the nurse Lex Menendez. There is mild drainage noted at the lateral pole on the left. Very mild erythema noted at the site. The remaining incision is intact and appears to be doing well. No fluctuant mass or fluid collection appreciated underneath the incision. Scant amount of bloody purulent discharge noted on the left side. Musculoskeletal:      Cervical back: Normal range of motion and neck supple. Skin:     General: Skin is warm and dry. Coloration: Skin is not pale. Neurological:      Mental Status: She is alert and oriented to person, place, and time. Procedures     MDM   Patient presented to the ED for evaluation of drainage from her  site. She had a  done on 23 May. Patient states that she had been doing well but noticed recently that there was some drainage on the left side of the incision. Labs and imaging were assessed. CBC showed no evidence of leukocytosis or left shift. No evidence of anemia. BMP was unremarkable showing no electrolyte abnormalities or evidence of renal insufficiency. I did do a CT of the abdomen pelvis to assess for any underlying deeper infection. There were inflammatory changes noted in the anterior pelvic wall skin and subcutaneous tissue which could be compatible with cellulitis. There was no drainable abscess. No soft tissue gas.   The wound appears to be healing well and patient was given a dose of antibiotics in the ED and will be discharged home on appropriate biotics and is advised to follow-up with her OB for further evaluation. ED Course as of 06/27/22 2241 Mon Jun 27, 2022 2207 Patient resting in bed no distress. Discussed results of labs thus far. Awaiting read of CT. [MS]   2236 Resting in bed comfortably. Discussed results of CT which shows inflammatory changes that could be consistent with cellulitis. No drainable fluid collection indicating abscess. We will give patient a dose of antibiotics here and she will be comfortable being discharged. She will follow-up with Dr. Kristin Wakefield. She understands if she has worsening symptoms or new concerns that she can return to the ED for further evaluation. [MS]      ED Course User Index  [MS] Denia Tanisha, DO       --------------------------------------------- PAST HISTORY ---------------------------------------------  Past Medical History:  has a past medical history of Mild pre-eclampsia in third trimester. Past Surgical History:  has no past surgical history on file. Social History:  reports that she has quit smoking. She has never used smokeless tobacco. She reports previous alcohol use. She reports previous drug use. Family History: family history is not on file. The patients home medications have been reviewed. Allergies: Patient has no known allergies.     -------------------------------------------------- RESULTS -------------------------------------------------  Labs:  Results for orders placed or performed during the hospital encounter of 06/27/22   CBC with Auto Differential   Result Value Ref Range    WBC 8.5 4.5 - 11.5 E9/L    RBC 4.39 3.50 - 5.50 E12/L    Hemoglobin 12.5 11.5 - 15.5 g/dL    Hematocrit 40.1 34.0 - 48.0 %    MCV 91.3 80.0 - 99.9 fL    MCH 28.5 26.0 - 35.0 pg    MCHC 31.2 (L) 32.0 - 34.5 %    RDW 12.6 11.5 - 15.0 fL    Platelets 100 894 - 890 E9/L    MPV 9.0 7.0 - 12.0 fL    Neutrophils % 51.6 43.0 - 80.0 %    Immature Granulocytes % 0.2 0.0 - 5.0 %    Lymphocytes % 36.1 20.0 - 42.0 % Monocytes % 8.9 2.0 - 12.0 %    Eosinophils % 2.8 0.0 - 6.0 %    Basophils % 0.4 0.0 - 2.0 %    Neutrophils Absolute 4.37 1.80 - 7.30 E9/L    Immature Granulocytes # 0.02 E9/L    Lymphocytes Absolute 3.05 1.50 - 4.00 E9/L    Monocytes Absolute 0.75 0.10 - 0.95 E9/L    Eosinophils Absolute 0.24 0.05 - 0.50 E9/L    Basophils Absolute 0.03 0.00 - 0.20 Q4/J   Basic Metabolic Panel w/ Reflex to MG   Result Value Ref Range    Sodium 137 132 - 146 mmol/L    Potassium reflex Magnesium 3.9 3.5 - 5.0 mmol/L    Chloride 103 98 - 107 mmol/L    CO2 22 22 - 29 mmol/L    Anion Gap 12 7 - 16 mmol/L    Glucose 74 74 - 99 mg/dL    BUN 10 6 - 20 mg/dL    CREATININE 0.9 0.5 - 1.0 mg/dL    GFR Non-African American >60 >=60 mL/min/1.73    GFR African American >60     Calcium 9.5 8.6 - 10.2 mg/dL       Radiology:  CT ABDOMEN PELVIS W IV CONTRAST Additional Contrast? None   Final Result   Inflammatory changes in the anterior pelvic wall skin and subcutaneous   tissues, which would be compatible with cellulitis in the appropriate   clinical setting. No drainable abscess. No soft tissue gas. No   intra-abdominal free fluid or free air. Bladder wall thickening is nonspecific given under distension; consider   correlation with urinalysis to evaluate for UTI clinically indicated. Heterogeneous appearance of the uterus is not unexpected given reported   history of recent .             ------------------------- NURSING NOTES AND VITALS REVIEWED ---------------------------  Date / Time Roomed:  2022  7:15 PM  ED Bed Assignment:      The nursing notes within the ED encounter and vital signs as below have been reviewed.    /72   Pulse 76   Temp 98.2 °F (36.8 °C) (Oral)   Resp 16   Ht 5' 8\" (1.727 m)   Wt 206 lb (93.4 kg)   LMP 2021   SpO2 98%   BMI 31.32 kg/m²   Oxygen Saturation Interpretation: Normal      ------------------------------------------ PROGRESS NOTES ------------------------------------------  I have spoken with the patient and discussed todays results, in addition to providing specific details for the plan of care and counseling regarding the diagnosis and prognosis. Their questions are answered at this time and they are agreeable with the plan. I discussed at length with them reasons for immediate return here for re evaluation. They will followup with primary care by calling their office tomorrow. --------------------------------- ADDITIONAL PROVIDER NOTES ---------------------------------  At this time the patient is without objective evidence of an acute process requiring hospitalization or inpatient management. They have remained hemodynamically stable throughout their entire ED visit and are stable for discharge with outpatient follow-up. The plan has been discussed in detail and they are aware of the specific conditions for emergent return, as well as the importance of follow-up. New Prescriptions    CEPHALEXIN (KEFLEX) 500 MG CAPSULE    Take 1 capsule by mouth 4 times daily for 10 days       Diagnosis:  1.  wound infection        Disposition:  Patient's disposition: Discharge to home  Patient's condition is stable.          Dre Monet DO  [phone]
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
## 2022-07-01 ENCOUNTER — HOSPITAL ENCOUNTER (EMERGENCY)
Age: 27
Discharge: HOME | End: 2022-07-01
Attending: EMERGENCY MEDICINE
Payer: COMMERCIAL

## 2022-07-01 VITALS
RESPIRATION RATE: 18 BRPM | OXYGEN SATURATION: 98 % | TEMPERATURE: 98 F | HEART RATE: 100 BPM | DIASTOLIC BLOOD PRESSURE: 94 MMHG | SYSTOLIC BLOOD PRESSURE: 138 MMHG

## 2022-07-01 DIAGNOSIS — Z51.89 VISIT FOR WOUND CHECK: Primary | ICD-10-CM

## 2022-07-01 PROCEDURE — 99282 EMERGENCY DEPT VISIT SF MDM: CPT

## 2022-07-01 ASSESSMENT — ENCOUNTER SYMPTOMS
DIARRHEA: 0
BLOOD IN STOOL: 0
CONSTIPATION: 0
ABDOMINAL PAIN: 0
NAUSEA: 0
VOMITING: 0
WHEEZING: 0
BACK PAIN: 1
SHORTNESS OF BREATH: 0

## 2022-07-01 NOTE — ED PROVIDER NOTES
Vikki Hoyt is a 72-year-old female presenting with tenderness at her  incision. Patient states she was seen here on 2022 for an infection of the  incision and was given Keflex. She notes she just started taking the antibiotic yesterday. Pt notes moderate pain at the site of her incision worsened with movement, urination and defecation and improved at rest. Today she was encouraged by her  to come to the ED due to her discomfort at the incision site. She states there is occasional bloody discharge from the wound but denies any fever, abd pain, chills, nausea, vomiting, purulent drainage from the incision, diarrhea, blood in her stools, and blood in her urine. Pt reports her  was on 22. Review of Systems   Constitutional: Negative for chills and fever. HENT: Negative for congestion. Respiratory: Negative for shortness of breath and wheezing. Cardiovascular: Negative for chest pain. Gastrointestinal: Negative for abdominal pain, blood in stool, constipation, diarrhea, nausea and vomiting. Genitourinary: Negative for difficulty urinating, dysuria, hematuria and vaginal discharge. Musculoskeletal: Positive for back pain. Skin:         incision drainage and pain   Neurological: Negative for headaches. All other systems reviewed and are negative. Physical Exam  Constitutional:       Appearance: Normal appearance. HENT:      Head: Normocephalic and atraumatic. Mouth/Throat:      Mouth: Mucous membranes are moist.      Pharynx: Oropharynx is clear. Eyes:      Extraocular Movements: Extraocular movements intact. Conjunctiva/sclera: Conjunctivae normal.   Cardiovascular:      Rate and Rhythm: Normal rate and regular rhythm. Heart sounds: Normal heart sounds. Pulmonary:      Effort: Pulmonary effort is normal.      Breath sounds: Normal breath sounds. Abdominal:      General: Abdomen is flat.  Bowel sounds are normal.      Palpations: Abdomen is soft. Musculoskeletal:      Cervical back: Normal range of motion and neck supple. Skin:     General: Skin is warm and dry. Comments: Well healing  incision; no erythema or edema or surrounding cellulitis   Neurological:      General: No focal deficit present. Mental Status: She is alert. Mental status is at baseline. Psychiatric:         Mood and Affect: Mood normal.         Behavior: Behavior normal.          Procedures     MDM  Number of Diagnoses or Management Options  Visit for wound check  Diagnosis management comments: Pt is a 24 y.o. female presenting to the ED with concern for infection of  incision. Pt was recently seen in ED and started on Keflex which she reports she started taking yesterday. Incision is clean, well-healing, with no signs of abscess or infection and pt is afebrile. Discussed plan to DC home to follow-up with OB/GYN and continue course of Keflex. Pt is agreeable. ED Course as of 22 1608      [address] ATTESTATION:     I have personally performed and/or participated in the history, exam, medical decision making, and procedures and agree with all pertinent clinical information unless otherwise noted. I have also reviewed and agree with the past medical, family and social history unless otherwise noted. I have discussed this patient in detail with the resident, and provided the instruction and education regarding patient here for an incision evaluation. About a month ago she had a , the incision has been healing well but she has some tenderness at the incision site, apparently was seen several days ago and told there may be a small infection starting and started on antibiotics. .  Denies fevers, sweats or chills. No current purulent drainage. No general abdominal pain. No flank pain or back pain. No dysuria.   My findings/plan: Patient is lying in the bed resting comfortably no distress. Heart rate regular, lungs are clear and equal.  Abdomen soft and nontender. Bikini line  incision healing well, clean, dry and intact with no surrounding erythema, swelling, purulent drainage or signs of abscess or infection. The incision line itself is mildly tender on palpation only. [NC]      ED Course User Index  [NC] Hadley Ramos DO        ED Course as of 22 1608      1430 ATTENDING PROVIDER ATTESTATION:     I have personally performed and/or participated in the history, exam, medical decision making, and procedures and agree with all pertinent clinical information unless otherwise noted. I have also reviewed and agree with the past medical, family and social history unless otherwise noted. I have discussed this patient in detail with the resident, and provided the instruction and education regarding patient here for an incision evaluation. About a month ago she had a , the incision has been healing well but she has some tenderness at the incision site, apparently was seen several days ago and told there may be a small infection starting and started on antibiotics. .  Denies fevers, sweats or chills. No current purulent drainage. No general abdominal pain. No flank pain or back pain. No dysuria. My findings/plan: Patient is lying in the bed resting comfortably no distress. Heart rate regular, lungs are clear and equal.  Abdomen soft and nontender. Bikini line  incision healing well, clean, dry and intact with no surrounding erythema, swelling, purulent drainage or signs of abscess or infection. The incision line itself is mildly tender on palpation only.        [NC]      ED Course User Index  [NC] Hadley Ramos DO       --------------------------------------------- PAST HISTORY ---------------------------------------------  Past Medical History:  has a past medical history of Mild pre-eclampsia in third trimester. Past Surgical History:  has no past surgical history on file. Social History:  reports that she has quit smoking. She has never used smokeless tobacco. She reports previous alcohol use. She reports previous drug use. Family History: family history is not on file. The patients home medications have been reviewed. Allergies: Patient has no known allergies. -------------------------------------------------- RESULTS -------------------------------------------------  Labs:  No results found for this visit on 07/01/22. Radiology:  No orders to display       ------------------------- NURSING NOTES AND VITALS REVIEWED ---------------------------  Date / Time Roomed:  7/1/2022  2:07 PM  ED Bed Assignment:  21/21    The nursing notes within the ED encounter and vital signs as below have been reviewed. BP (!) 138/94   Pulse 100   Temp 98 °F (36.7 °C) (Oral)   Resp 18   LMP 09/30/2021   SpO2 98%   Breastfeeding Unknown   Oxygen Saturation Interpretation: Normal      ------------------------------------------ PROGRESS NOTES ------------------------------------------  4:07 PM EDT  I have spoken with the patient and discussed todays results, in addition to providing specific details for the plan of care and counseling regarding the diagnosis and prognosis. Their questions are answered at this time and they are agreeable with the plan. I discussed at length with them reasons for immediate return here for re evaluation. They will followup with their OB/GYN by calling their office on Monday.      --------------------------------- ADDITIONAL PROVIDER NOTES ---------------------------------  At this time the patient is without objective evidence of an acute process requiring hospitalization or inpatient management. They have remained hemodynamically stable throughout their entire ED visit and are stable for discharge with outpatient follow-up.      The plan has been discussed in detail and they are aware of the specific conditions for emergent return, as well as the importance of follow-up. Discharge Medication List as of 7/1/2022  2:44 PM          Diagnosis:  1. Visit for wound check        Disposition:  Patient's disposition: Discharge to home  Patient's condition is stable.          Dariel Muniz DO  Resident  07/01/22 [id number]

## 2025-05-12 ENCOUNTER — HOSPITAL ENCOUNTER (EMERGENCY)
Age: 30
Discharge: HOME | End: 2025-05-12
Attending: EMERGENCY MEDICINE
Payer: COMMERCIAL

## 2025-05-12 VITALS
HEART RATE: 100 BPM | DIASTOLIC BLOOD PRESSURE: 94 MMHG | RESPIRATION RATE: 16 BRPM | WEIGHT: 186 LBS | BODY MASS INDEX: 28.28 KG/M2 | TEMPERATURE: 98 F | OXYGEN SATURATION: 100 % | SYSTOLIC BLOOD PRESSURE: 168 MMHG

## 2025-05-12 DIAGNOSIS — J06.9 ACUTE UPPER RESPIRATORY INFECTION: Primary | ICD-10-CM

## 2025-05-12 PROCEDURE — 99283 EMERGENCY DEPT VISIT LOW MDM: CPT

## 2025-05-12 RX ORDER — BROMPHENIRAMINE MALEATE, PSEUDOEPHEDRINE HYDROCHLORIDE, AND DEXTROMETHORPHAN HYDROBROMIDE 2; 30; 10 MG/5ML; MG/5ML; MG/5ML
5 SYRUP ORAL 4 TIMES DAILY PRN
Qty: 120 ML | Refills: 0 | Status: SHIPPED | OUTPATIENT
Start: 2025-05-12

## 2025-05-12 RX ORDER — DOXYCYCLINE HYCLATE 100 MG
100 TABLET ORAL 2 TIMES DAILY
Qty: 20 TABLET | Refills: 0 | Status: SHIPPED | OUTPATIENT
Start: 2025-05-12 | End: 2025-05-22

## 2025-05-12 RX ORDER — DIVALPROEX SODIUM 125 MG/1
CAPSULE, COATED PELLETS ORAL 2 TIMES DAILY
COMMUNITY

## 2025-05-12 ASSESSMENT — ENCOUNTER SYMPTOMS
SINUS PRESSURE: 0
EYE DISCHARGE: 0
NAUSEA: 0
ABDOMINAL DISTENTION: 0
DIARRHEA: 0
VOMITING: 0
RHINORRHEA: 1
WHEEZING: 0
SORE THROAT: 0
COUGH: 1
BACK PAIN: 0
SHORTNESS OF BREATH: 0
EYE PAIN: 0
EYE REDNESS: 0

## 2025-05-12 ASSESSMENT — PAIN - FUNCTIONAL ASSESSMENT
PAIN_FUNCTIONAL_ASSESSMENT: NONE - DENIES PAIN
PAIN_FUNCTIONAL_ASSESSMENT: NONE - DENIES PAIN

## 2025-05-12 NOTE — ED PROVIDER NOTES
The history is provided by the patient.   Cold Symptoms  Presenting symptoms: congestion, cough, facial pain and rhinorrhea    Presenting symptoms: no ear pain, no fatigue, no fever and no sore throat    Severity:  Moderate  Onset quality:  Gradual  Duration:  5 days  Chronicity:  New  Associated symptoms: no arthralgias, no headaches and no wheezing         Review of Systems   Constitutional:  Negative for chills, fatigue and fever.   HENT:  Positive for congestion and rhinorrhea. Negative for ear pain, sinus pressure and sore throat.    Eyes:  Negative for pain, discharge and redness.   Respiratory:  Positive for cough. Negative for shortness of breath and wheezing.    Cardiovascular:  Negative for chest pain.   Gastrointestinal:  Negative for abdominal distention, diarrhea, nausea and vomiting.   Genitourinary:  Negative for dysuria and frequency.   Musculoskeletal:  Negative for arthralgias and back pain.   Skin:  Negative for rash and wound.   Neurological:  Negative for weakness and headaches.   Hematological:  Negative for adenopathy.   All other systems reviewed and are negative.       Physical Exam  Vitals and nursing note reviewed.   Constitutional:       Appearance: She is well-developed.   HENT:      Head: Normocephalic and atraumatic.      Right Ear: Tympanic membrane is retracted.      Left Ear: Tympanic membrane is retracted.      Nose: Mucosal edema, congestion and rhinorrhea present.   Eyes:      Pupils: Pupils are equal, round, and reactive to light.   Cardiovascular:      Rate and Rhythm: Normal rate and regular rhythm.      Heart sounds: Normal heart sounds. No murmur heard.  Pulmonary:      Effort: Pulmonary effort is normal. No respiratory distress.      Breath sounds: Normal breath sounds. No wheezing or rales.   Abdominal:      General: Bowel sounds are normal.      Palpations: Abdomen is soft.      Tenderness: There is no abdominal tenderness. There is no guarding or rebound.